# Patient Record
Sex: FEMALE | Race: WHITE | Employment: FULL TIME | URBAN - METROPOLITAN AREA
[De-identification: names, ages, dates, MRNs, and addresses within clinical notes are randomized per-mention and may not be internally consistent; named-entity substitution may affect disease eponyms.]

---

## 2018-12-11 ENCOUNTER — OFFICE VISIT (OUTPATIENT)
Dept: ORTHOPEDIC SURGERY | Age: 43
End: 2018-12-11
Payer: COMMERCIAL

## 2018-12-11 VITALS
BODY MASS INDEX: 22.82 KG/M2 | SYSTOLIC BLOOD PRESSURE: 112 MMHG | HEIGHT: 65 IN | WEIGHT: 137 LBS | DIASTOLIC BLOOD PRESSURE: 71 MMHG | HEART RATE: 72 BPM

## 2018-12-11 DIAGNOSIS — M25.561 RIGHT KNEE PAIN, UNSPECIFIED CHRONICITY: Primary | ICD-10-CM

## 2018-12-11 DIAGNOSIS — M22.41 CHONDROMALACIA OF RIGHT PATELLOFEMORAL JOINT: ICD-10-CM

## 2018-12-11 PROCEDURE — 99204 OFFICE O/P NEW MOD 45 MIN: CPT | Performed by: ORTHOPAEDIC SURGERY

## 2018-12-11 NOTE — PROGRESS NOTES
Review of Systems   Constitutional: Positive for unexpected weight change. Musculoskeletal:        Frozen shoulder   All other systems reviewed and are negative.

## 2018-12-11 NOTE — PROGRESS NOTES
also denies any new injuries. She has not had an MRI since her arthroscopy. Review of Systems:  A 14 point review of systems was completed by the patient on 12/11/2018 and is available in the media section of the scanned medical record and was reviewed on 12/11/2018. The review is negative with the exception of those things mentioned in the HPI and Past Medical History. Past History:  No past medical history on file. Past Surgical History:   Procedure Laterality Date    ANKLE FRACTURE SURGERY Left 06/2013    KNEE ARTHROSCOPY Right 04/2018     No current outpatient prescriptions on file prior to visit. No current facility-administered medications on file prior to visit. Social History     Social History    Marital status:      Spouse name: N/A    Number of children: N/A    Years of education: N/A     Occupational History    Not on file. Social History Main Topics    Smoking status: Former Smoker    Smokeless tobacco: Never Used    Alcohol use Not on file    Drug use: Unknown    Sexual activity: Not on file     Other Topics Concern    Not on file     Social History Narrative    No narrative on file     No family history on file. Current Medications:    Current Outpatient Prescriptions   Medication Sig Dispense Refill    TURMERIC PO Take by mouth       No current facility-administered medications for this visit. Allergies: Allergies   Allergen Reactions    Hydrocodone     Dexamethasone Rash       Physical Exam:  Vitals:    12/11/18 0938   BP: 112/71   Pulse: 72     General: Gavin Cee is a healthy and well appearing 37y.o. year old female who is sitting comfortably in our office in no acute distress. Neuro: alert. oriented  Eyes: Extra-ocular muscles intact  Mouth: Oral mucosa moist. No perioral lesions  Pulm: Respirations unlabored and regular.   Heart: Regular rate and rhythm   Skin: warm, well perfused    Knee any errors to my attention for an addendum. All efforts were made to ensure that this office note is accurate. This dictation was performed with a verbal recognition program (DRAGON) and it was checked for errors. It is possible that there are still dictated errors within this office note. If so, please bring any errors to my attention for an addendum. All efforts were made to ensure that this office note is accurate. Supervising Physician Attestation:  I, Dr. Maninder Zhang, personally performed the services described in this documentation as scribed above, and it is both accurate and complete and I agree with all pertinent clinical information. I personally interviewed the patient and performed a physical examination and medical decision making. I discussed the patient's condition and treatment options and have  reviewed and agree with the past medical, family and social history unless otherwise noted. All of the patient's questions were answered.       Board Certified Orthopaedic Surgeon  [facility] and [address] and [address] and [employer]  Professor of [employer] [address] [employer]

## 2018-12-13 DIAGNOSIS — M25.561 RIGHT KNEE PAIN, UNSPECIFIED CHRONICITY: Primary | ICD-10-CM

## 2018-12-18 ENCOUNTER — OFFICE VISIT (OUTPATIENT)
Dept: ORTHOPEDIC SURGERY | Age: 43
End: 2018-12-18
Payer: COMMERCIAL

## 2018-12-18 VITALS
WEIGHT: 137 LBS | DIASTOLIC BLOOD PRESSURE: 74 MMHG | SYSTOLIC BLOOD PRESSURE: 129 MMHG | BODY MASS INDEX: 22.82 KG/M2 | HEIGHT: 65 IN | HEART RATE: 61 BPM

## 2018-12-18 DIAGNOSIS — M22.41 CHONDROMALACIA OF RIGHT PATELLOFEMORAL JOINT: ICD-10-CM

## 2018-12-18 DIAGNOSIS — M25.561 RIGHT KNEE PAIN, UNSPECIFIED CHRONICITY: Primary | ICD-10-CM

## 2018-12-18 PROCEDURE — 99213 OFFICE O/P EST LOW 20 MIN: CPT | Performed by: ORTHOPAEDIC SURGERY

## 2018-12-18 NOTE — PROGRESS NOTES
Chief Complaint    Follow-up (right knee, MRI results)      History of Present Illness:  Gavin Cee is a 37 y.o. female who presents for follow up of her right knee. Patient was able to get an MRI and presents today review the results. Patient is a runner that has been having patellofemoral syndrome for some time. She reports that she is been unable to run more than 3 miles without having severe symptoms of discomfort. Patient reports that since her last visit she has been able to do elliptical exercises and that has been okay so far. She denies any new injuries. No past medical history on file. Past Surgical History:   Procedure Laterality Date    ANKLE FRACTURE SURGERY Left 06/2013    KNEE ARTHROSCOPY Right 04/2018       No family history on file. Social History     Social History    Marital status:      Spouse name: N/A    Number of children: N/A    Years of education: N/A     Social History Main Topics    Smoking status: Former Smoker    Smokeless tobacco: Never Used    Alcohol use None    Drug use: Unknown    Sexual activity: Not Asked     Other Topics Concern    None     Social History Narrative    None       Current Outpatient Prescriptions   Medication Sig Dispense Refill    TURMERIC PO Take by mouth       No current facility-administered medications for this visit. Allergies   Allergen Reactions    Hydrocodone     Dexamethasone Rash       Review of Systems:  A 14 point review of systems was completed by the patient and is available in the media section of the scanned medical record and was reviewed on 12/18/2018. The review is negative with the exception of those things mentioned in the HPI and Past Medical History     Vital Signs:   /74   Pulse 61   Ht 5' 5\" (1.651 m)   Wt 137 lb (62.1 kg)   BMI 22.80 kg/m²     General Exam:   Mental Status: The patient is oriented to time, place and person.   The patient's mood and affect are related to patellofemoral chondromalacia with mild fat pad inflammation. Encounter Diagnoses   Name Primary?  Right knee pain, unspecified chronicity Yes    Chondromalacia of right patellofemoral joint        Treatment Plan:  We did review the MRI with the patient today. At this time we recommend that she focus on progressing her exercise routine with building up on the elliptical 1st and then the biking. We also recommend that she start physical therapy and have isokinetic strengths testing performed to evaluate her \"quadriceps and hamstring strength. All her questions were fully answered today. We'll see her back in 4 weeks prn or as-needed basis. 5:43 PM      Mauricio Wilson PA-C  Orthopaedic Sports Medicine Physician Assistant    During this examination, Lolita Ingram PA-C, functioned as a scribe for Dr. Divya Ramos. This dictation was performed with a verbal recognition program (DRAGON) and it was checked for errors. It is possible that there are still dictated errors within this office note. If so, please bring any errors to my attention for an addendum. All efforts were made to ensure that this office note is accurate. This dictation was performed with a verbal recognition program (DRAGON) and it was checked for errors. It is possible that there are still dictated errors within this office note. If so, please bring any errors to my attention for an addendum. All efforts were made to ensure that this office note is accurate. Supervising Physician Attestation:  I, Dr. Divya Ramos, personally performed the services described in this documentation as scribed above, and it is both accurate and complete and I agree with all pertinent clinical information. I personally interviewed the patient and performed a physical examination and medical decision making.  I discussed the patient's condition and treatment options and have  reviewed and agree with the past medical, family and social history unless otherwise noted. All of the patient's questions were answered.       Board Certified Orthopaedic Surgeon  [facility] and [address] and [address] and [employer]  Professor of [address]

## 2019-01-02 ENCOUNTER — HOSPITAL ENCOUNTER (OUTPATIENT)
Dept: PHYSICAL THERAPY | Age: 44
Setting detail: THERAPIES SERIES
Discharge: HOME | End: 2019-01-02
Payer: COMMERCIAL

## 2019-01-02 PROCEDURE — 97110 THERAPEUTIC EXERCISES: CPT

## 2019-01-02 PROCEDURE — G8978 MOBILITY CURRENT STATUS: HCPCS

## 2019-01-02 PROCEDURE — 97750 PHYSICAL PERFORMANCE TEST: CPT

## 2019-01-02 PROCEDURE — 97162 PT EVAL MOD COMPLEX 30 MIN: CPT

## 2019-01-02 PROCEDURE — 97530 THERAPEUTIC ACTIVITIES: CPT

## 2019-01-02 PROCEDURE — G8979 MOBILITY GOAL STATUS: HCPCS

## 2019-02-22 ENCOUNTER — HOSPITAL ENCOUNTER (OUTPATIENT)
Dept: PHYSICAL THERAPY | Age: 44
Setting detail: THERAPIES SERIES
End: 2019-02-22
Payer: COMMERCIAL

## 2019-02-27 ENCOUNTER — HOSPITAL ENCOUNTER (OUTPATIENT)
Dept: PHYSICAL THERAPY | Age: 44
Setting detail: THERAPIES SERIES
Discharge: HOME | End: 2019-02-27
Payer: COMMERCIAL

## 2019-02-27 PROCEDURE — 97110 THERAPEUTIC EXERCISES: CPT

## 2019-02-27 PROCEDURE — 97530 THERAPEUTIC ACTIVITIES: CPT

## 2019-02-27 PROCEDURE — 97750 PHYSICAL PERFORMANCE TEST: CPT

## 2019-06-05 ENCOUNTER — HOSPITAL ENCOUNTER (OUTPATIENT)
Dept: PHYSICAL THERAPY | Age: 44
Setting detail: THERAPIES SERIES
Discharge: HOME | End: 2019-06-05
Payer: COMMERCIAL

## 2019-06-05 PROCEDURE — 97110 THERAPEUTIC EXERCISES: CPT

## 2019-06-05 PROCEDURE — 97530 THERAPEUTIC ACTIVITIES: CPT

## 2019-06-05 PROCEDURE — 97750 PHYSICAL PERFORMANCE TEST: CPT

## 2019-06-05 NOTE — PLAN OF CARE
quad and hamstring following PF Protection principles. It was discussed patient can continue to attempt to increase running program, but to monitor symptoms closely and to avoid increased sharp pain and pain at night time. Patient was educated that with MD diagnosis of PF Chondromalacia, high impact activities such as running causes an increased amount of strain and can lead to increased symptoms in knee. It was recommend she continue to cross train with spinning, elliptical, and/or swimming. She verbalized understanding. She will return in 6-8 weeks for repeat isokinetic testing for strength re-evaluation as well as update on symptoms in knee.     Date range of Visits: 19 - 19  Total Visits: 3    Physical Therapy Daily Treatment Note  Date:  2019    Patient Name:  Kota Trevizo    :  1975  MRN: [id number]  Restrictions/Precautions:    Medical/Treatment Diagnosis Information:  · Diagnosis: M25.561 (ICD-10-CM) - Right knee pain, unspecified chronicity  Treatment Diagnosis: Signs and symptoms consistent with MD diagnosis of patellofemoral chondromalacia with increased pain, decreased strength, decreased flexibility and decreased endurance  Insurance/Certification information:  PT Insurance Information: PT BENEFITS 2019 FACILITY/ ANTHEM/ EFFECTIVE 18/ ACTIVE/ DED 4000 MET 0/ PAYS 100%/ OOP 4000 MET 0/ 30 [facility]Y HARD LIMIT/ 0 [facility]/ [facility]#[id number]/ 18 PAG  Physician Information:  Referring Practitioner: Daniel Messina of care signed (Y/N): Signed    Date of Patient follow up with Physician: not yet scheduled     G-Code (if applicable):      Date G-Code Applied:  19     OUTCOME MEASURE DATE DEFICIT   LEFS 19 20% Deficit   LEFS 19 4% Deficit         Progress Note: [x]  Yes  []  No  Next due by: Visit #10       Latex Allergy:  [x]NO      []YES  Preferred Language for Healthcare:   [x]English       []other:    Visit # Insurance Allowable   3 30 VPCY     Pain level: 0/10     SUBJECTIVE:  Patient reports 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 She has been running (x2 per week, outdoor, flat) and spinning (3-4x/week with various resistance, higher resistance aggravates). She has no pain when running flat ground but does need a day of rest in-between to prevent onset of knee pain. She does do spinning on her off days from running without issues. No pain with ADL's. Overall she feels her pain is better in past 6 months but she also admits she has dialed back her activity. She has not run longer than 5 miles and all on flat ground.       OBJECTIVE:       6/5/19  Flexibility L R Comment   Hamstring Mod Mod     Gastroc Mod Mod     ITB Mod WNL     Quad Mild Mild ~1 fist    LENCHO Mod Mod Tightness no pain      1/2/19  ROM PROM AROM Overpressure Comment     L R L R L R     Flexion     135 135         Extension     +2 +2                                                6/5/19  Strength L R Comment   Quad See Biodex See Biodex      Hamstring See Biodex See Biodex      Gastroc         Hip  flexion 5 5      Hip abd 4+ 4+      Hip Ext 5 5                   1/2/19  Special Test Results/Comment   Meniscal Click Neg   Crepitus Pos bilaterally R>L   Hughston's Plica Neg   Kenton's Patella Grind Neg      2/27/19  Girth L R   Mid Patella 33.6 33.5   Suprapatellar 33.7 33.8   5cm above 38.8 37.0   15cm above 46.7 46.1      Orthopedic Special Tests: SL > 10 seconds bilaterally but hip drop noted on RLE; poor hip extension and early heel rise with DL squat        Isokinetic Strength Testing Results Summary  Knee   On 6/5/19 the patient, Denny Archuleta, underwent an Isokinetic Strength Test to evaluate current status and progress of the strengthening phase of his/her current rehabilitation program.  She is currently being treated for Right Knee pain / patellofemoral chondromalacia.     Attached you will find a computer generated summary of the results which outlines the current status.       To summarize these results you will find that Benjamin Ramos underwent a test measuring the strength of the knee Quadriceps and Hamstrings muscle groups at isokinetic speeds of 180 and 300 degrees/second. Standard protocol of testing is to provide pre-test stretching and warm up of both extremities followed by instruction in the test procedure and \"practice\" repetitions prior to each actual test session. The uninjured extremity undergoes the test procedure first followed by the injured extremity.   The two speeds of resistance represent the power and endurance functions of the muscle groups tested.          Bilateral Difference:  Quadricep 180 deg/sec: 2.9% (+1.8%) [] Deficit   [x] Surplus 300 deg/sec: 1.5% (+8.3%) [x] Deficit   [] Surplus   Hamstring 180 deg/sec: 6.3% (+29.2%) [x] Deficit   [] Surplus 300 deg/sec: 16.6% (+8.4%) [x] Deficit   [] Surplus      Normative Data, 180 degrees/second:  Quadricep Normal: 50-65% Patient: 45.0%   Hamstring Normal: 38-49% Patient: 24.7%      Normative Data, 300 degrees/second:  Quadricep Normal: 30-45% Patient: 37.2%   Hamstring Normal: 24-36% Patient: 27.4%         RESTRICTIONS/PRECAUTIONS: PF Protection Program    Exercises/Interventions:  Exercise/Equipment Resistance/Repetitions Other comments   Stretching       Hamstring 3 x 30\" each    Hip Flexor     ITB     Figure 4     Quad 3 x 30\" each    Inclined Calf 3 x 30\" each    Towel Pull             ROM       EOB Self-Assist     Active     Hang Weights     Sheet Pulls     Recumbent Bike     ERMI     Ankle Pumps             Patellar Glides       Medial       Superior       Inferior               Isometrics       Quad sets      Add sets              SLR       Supine     Prone     Abduction     Adducton     SLR+               Glutes       Supine Clams     S/L Clams     Side Stepping       Monster walks               CKC       Weight Shift     Calf raises        Step ups       SL DL               PRE       Cable Column               Balance       Tilt board       SLS      Biodex balance               Other       Bike 5'     Treadmill       Gait Training          Manual Interventions               Therapeutic Exercise and NMR EXR  [x] (47613) Provided verbal/tactile cueing for activities related to strengthening, flexibility, endurance, ROM for improvements in LE, proximal hip, and core control with self care, mobility, lifting, ambulation.  [] (86986) Provided verbal/tactile cueing for activities related to improving balance, coordination, kinesthetic sense, posture, motor skill, proprioception  to assist with LE, proximal hip, and core control in self care, mobility, lifting, ambulation and eccentric single leg control.      NMR and Therapeutic Activities:    [x] (40373 or 77835) Provided verbal/tactile cueing for activities related to improving balance, coordination, kinesthetic sense, posture, motor skill, proprioception and motor activation to allow for proper function of core, proximal hip and LE with self care and ADLs  [] (10259) Gait Re-education- Provided training and instruction to the patient for proper LE, core and proximal hip recruitment and positioning and eccentric body weight control with ambulation re-education including up and down stairs     Home Exercise Program:    [x] (73235) Reviewed/Progressed HEP activities related to strengthening, flexibility, endurance, ROM of core, proximal hip and LE for functional self-care, mobility, lifting and ambulation/stair navigation   [] (10735)Reviewed/Progressed HEP activities related to improving balance, coordination, kinesthetic sense, posture, motor skill, proprioception of core, proximal hip and LE for self care, mobility, lifting, and ambulation/stair navigation      Manual Treatments:  PROM / STM / Oscillations-Mobs:  G-I, II, III, IV (PA's, Inf., Post.)  [] (98637) Provided manual therapy to mobilize LE, proximal hip and/or LS spine soft tissue/joints for the purpose of modulating pain, promoting relaxation,  increasing ROM, reducing/eliminating soft tissue swelling/inflammation/restriction, improving soft tissue extensibility and allowing for proper ROM for normal function with self care, mobility, lifting and ambulation. Modalities:  Denied    Charges:  Timed Code Treatment Minutes: 40'   Total Treatment Minutes: 36'       [] EVAL (LOW) 455 [id number] (typically 20 minutes face-to-face)  [] EVAL (MOD) 80844 (typically 30 minutes face-to-face)  [] EVAL (HIGH) 69938 (typically 45 minutes face-to-face)  [] RE-EVAL   [x] RI(83927) x  1   [] IONTO  [] NMR (28841) x      [] VASO  [] Manual (06866) x       [x] Other: PPT x 1  [x] TA x  1    [] Mech Traction (19509)  [] ES(attended) (64712)      [] ES (un) (79020):       GOALS:  Patient stated goal: Return to marathon training and running    Therapist goals for Patient:   Short Term Goals: To be achieved in: 2 weeks  1. Independent in HEP and progression per patient tolerance, in order to prevent re-injury. MET  2. Patient will have a decrease in pain to facilitate improvement in movement, function, and ADLs as indicated by Functional Deficits. MET    Long Term Goals: To be achieved in: 8 weeks  1. Disability index score of 10% or less for the LEFS to assist with reaching prior level of function. MET  2. Patient will demonstrate increased flexibility to mild or better in BLE to allow for proper joint functioning as indicated by patients Functional Deficits. NOT MET  3. Patient will demonstrate an increase in quad strength to >50% on isokinetic testing PKTQ/BW %  to allow for proper functional mobility as indicated by patients Functional Deficits. NOT MET  4. Patient will return to running > 3 miles without increased symptoms or restriction. MET  5.  Patient will demonstrate strong understanding of patellofemoral protection program in order to continue working independently without risking re-injury (patient specific functional goal)  MET    Progression Towards Functional goals:  [x] Patient is progressing as expected towards functional goals listed. [] Progression is slowed due to complexities listed. [] Progression has been slowed due to co-morbidities. [] Plan just implemented, too soon to assess goals progression  [] Other:     ASSESSMENT:  See above. Treatment/Activity Tolerance:  [x] Patient tolerated treatment well [] Patient limited by fatique  [] Patient limited by pain  [] Patient limited by other medical complications  [] Other:     Patient education:  1/2/19: review of isokinetic test results; extensive review of gym program and PF Protection program  2/27/19: reviewed biodex test results; reviewed HEP and gym program progressions and ideas for quad and hamstring strengthening; provided walk-jog handout  6/5/19: review of biodex test results, on PF joint protection principles and symptoms of chondromalacia, review of gym program + running program    Prognosis: [] Good [x] Fair  [] Poor    Patient Requires Follow-up: [x] Yes  [] No    PLAN: Independent gym / home program, follow up with PT in [place] for follow up as needed or future strength testing  [] Continue per plan of care [] Davon Navarro current plan (see comments)  [x] Plan of care initiated [] Hold pending MD visit [] Discharge    Repeat biodex  LEFS  Strength re-test    Electronically signed by: Brennan Vivas PT, DPT    *Note: If patient does not return for scheduled / recommended follow up visit, this note will serve as their discharge note from physical therapy.

## 2019-06-26 DIAGNOSIS — M22.41 CHONDROMALACIA OF RIGHT PATELLOFEMORAL JOINT: Primary | ICD-10-CM

## 2019-07-24 ENCOUNTER — APPOINTMENT (OUTPATIENT)
Dept: PHYSICAL THERAPY | Age: 44
End: 2019-07-24
Payer: COMMERCIAL

## 2019-07-25 ENCOUNTER — HOSPITAL ENCOUNTER (OUTPATIENT)
Dept: PHYSICAL THERAPY | Age: 44
Setting detail: THERAPIES SERIES
Discharge: HOME | End: 2019-07-25
Payer: COMMERCIAL

## 2019-07-25 ENCOUNTER — OFFICE VISIT (OUTPATIENT)
Dept: ORTHOPEDIC SURGERY | Age: 44
End: 2019-07-25
Payer: COMMERCIAL

## 2019-07-25 VITALS
HEART RATE: 64 BPM | DIASTOLIC BLOOD PRESSURE: 68 MMHG | SYSTOLIC BLOOD PRESSURE: 121 MMHG | HEIGHT: 65 IN | WEIGHT: 137 LBS | BODY MASS INDEX: 22.82 KG/M2

## 2019-07-25 DIAGNOSIS — M25.561 RIGHT KNEE PAIN, UNSPECIFIED CHRONICITY: ICD-10-CM

## 2019-07-25 DIAGNOSIS — M22.41 CHONDROMALACIA OF RIGHT PATELLOFEMORAL JOINT: Primary | ICD-10-CM

## 2019-07-25 PROCEDURE — 97530 THERAPEUTIC ACTIVITIES: CPT

## 2019-07-25 PROCEDURE — 97110 THERAPEUTIC EXERCISES: CPT

## 2019-07-25 PROCEDURE — 97750 PHYSICAL PERFORMANCE TEST: CPT

## 2019-07-25 PROCEDURE — 99213 OFFICE O/P EST LOW 20 MIN: CPT | Performed by: ORTHOPAEDIC SURGERY

## 2021-01-14 ENCOUNTER — OFFICE VISIT (OUTPATIENT)
Dept: ORTHOPEDIC SURGERY | Age: 46
End: 2021-01-14
Payer: COMMERCIAL

## 2021-01-14 VITALS — BODY MASS INDEX: 23.14 KG/M2 | WEIGHT: 144 LBS | TEMPERATURE: 97.3 F | HEIGHT: 66 IN

## 2021-01-14 DIAGNOSIS — M25.562 PAIN IN BOTH KNEES, UNSPECIFIED CHRONICITY: ICD-10-CM

## 2021-01-14 DIAGNOSIS — M17.11 PATELLOFEMORAL ARTHRITIS OF RIGHT KNEE: Primary | ICD-10-CM

## 2021-01-14 DIAGNOSIS — M25.561 PAIN IN BOTH KNEES, UNSPECIFIED CHRONICITY: ICD-10-CM

## 2021-01-14 PROCEDURE — 99213 OFFICE O/P EST LOW 20 MIN: CPT | Performed by: ORTHOPAEDIC SURGERY

## 2021-01-18 NOTE — PROGRESS NOTES
[facility]  History and Physical  Knee Pain    Date:  2021    Name:  Maurilio Storm  Address:  [address] [place] [address]    :  1975      Age:   39 y.o.    SSN:  [id number]      Medical Record Number:  <[id number]>      Chief Complaint    Follow-up (bilateral knees, right>left )      History of Present Illness:  Maurilio Storm is a 39 y.o. female who presents for an evaluation of her right knee pain. 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 Patient was last seen in 2019 where she was once again diagnosed with patellofemoral chondromalacia of the right knee as the etiology to her symptoms. Patient observed conservative therapy and activity modification noting a reduction in her pain and symptoms. Patient has returned to running and cycling multiple times a week over the past year. She states that she runs 4 miles approximately 3 days a week and cycles approximately 8 miles a day 2-3 times a week. Patient states her pain has begun to become more frequent. She noted pain mostly during her runs after the first half mile to mile. Now she notes pain during the day with her daily activities most notably with prolonged walking or stairs. She rates her pain 8/10 mostly dull and achy with occasional sharp bouts in the patellofemoral joint. Patient denies any mechanical symptoms. The patient denies any new injury. The patient denies any clicking, popping, catching, giving way, joint locking, numbness, paresthesias, or weakness.       Pain Assessment  Location of Pain: Knee  Location Modifiers: Left, Right  Severity of Pain: 2  Quality of Pain: Sharp  Duration of Pain: Persistent  Frequency of Pain: Constant  Aggravating Factors: Walking, Squatting, Stairs, Other (Comment)(running)  Relieving Factors: Rest  Result of Injury: No  Work-Related Injury: No  Are there other pain locations you wish to document?: No    No past medical history on file. Past Surgical History:   Procedure Laterality Date    ANKLE FRACTURE SURGERY Left 06/2013    KNEE ARTHROSCOPY Right 04/2018       No family history on file. Social History     Socioeconomic History    Marital status:      Spouse name: None    Number of children: None    Years of education: None    Highest education level: None   Occupational History    None   Social Needs    Financial resource strain: None    Food insecurity     Worry: None     Inability: None    Transportation needs     Medical: None     Non-medical: None   Tobacco Use    Smoking status: Former Smoker    Smokeless tobacco: Never Used   Substance and Sexual Activity    Alcohol use: None    Drug use: None    Sexual activity: None   Lifestyle    Physical activity     Days per week: None     Minutes per session: None    Stress: None   Relationships    Social connections     Talks on phone: None     Gets together: None     Attends Christian service: None     Active member of club or organization: None     Attends meetings of clubs or organizations: None     Relationship status: None    Intimate partner violence     Fear of current or ex partner: None     Emotionally abused: None     Physically abused: None     Forced sexual activity: None   Other Topics Concern    None   Social History Narrative    None       Current Outpatient Medications   Medication Sig Dispense Refill    TURMERIC PO Take by mouth       No current facility-administered medications for this visit. Allergies   Allergen Reactions    Hydrocodone     Dexamethasone Rash         Review of Systems:  A 14 point review of systems was completed by the patient and is available in the media section of the scanned medical record and was reviewed on 1/18/2021.   The review is negative with the exception of those things mentioned in the HPI and Past Medical History   Review of Systems   Musculoskeletal: Positive for arthralgias. Negative for myalgias. Neurological: Negative for weakness and numbness. Vital Signs:   Temp 97.3 °F (36.3 °C) (Infrared)   Ht 5' 6\" (1.676 m)   Wt 144 lb (65.3 kg)   BMI 23.24 kg/m²     General Exam:   General: Maurilio Storm is a healthy and well appearing 39y.o. year old female who is sitting comfortably in our office in no acute distress. Neuro: Alert & Oriented x 3,  normal,  no focal deficits noted. Normal mood & affect. Eyes: Sclera clear  Ears: Normal external ear  Mouth: Patient wearing a mask  Pulm: Respirations unlabored and regular   Skin: Warm, well perfused      Knee Examination: Right    Inspection: No effusion, ecchymosis, discoloration, erythema, excessive warmth. no gross deformities, no signs of infection. Palpation: There is patellofemoral crepitus, there is no joint line tenderness. No other osseous or soft tissue tenderness around the knee. Negative calf tenderness    Range of Motion: 0-145 degrees without pain or difficulty. Pain with patellar compression. Strength:  5/5 quadriceps, 5/5 hamstrings    Special Tests: No ligament instability, valgus and varus stress test are stable at 0 and 30°. Lachman's exhibits a solid endpoint. Negative posterior drawer. Negative Homans sign    Gait: Steady, Non antalgic, without the use of assistive devices    Alignment:  Varus deformity    Neuro: Sensation equal bilateral lower extremities    Vascular: 2+ posterior tibialis pulse      Contralateral Knee Comparison Examination: Left    Inspection:  No effusion, ecchymosis, discoloration, erythema, excessive warmth. no gross deformities, no signs of infection. Palpation: There is patellofemoral crepitus, there is no joint line tenderness. No other osseous or soft tissue tenderness around the knee.   Negative calf tenderness    Range of Motion: 0-145 degrees without pain or difficulty. no pain with patellar compression    Strength:  5/5 quadriceps, 5/5 hamstrings    Special Tests: No ligament instability, valgus and varus stress test are stable at 0 and 30°. Lachman's exhibits a solid endpoint. Negative posterior drawer. Negative Homans sign    Gait: Steady, Non antalgic, without the use of assistive devices    Alignment: Varus deformity    Neuro: Sensation equal bilateral lower extremities    Vascular: 2+ posterior tibialis pulse      Radiology:     X-rays obtained and reviewed in office: AP and merchant view of both knees and a lateral of the right. Impression: No fractures, dislocations, visible tumors, or signs of acute trauma. Patient exhibits decreased joint space in the medial and lateral femoral-tibial compartments bilaterally, predominantly in the medial joint lines. Patient has decreased joint spacing in the patellofemoral joints bilaterally. Patella is riding appropriately in the trochlear groove with no subluxation or tilt noted. No osteophytes or bone growths noted. No loose bodies or foreign bodies. Office Procedures:  Orders Placed This Encounter   Procedures    XR KNEE LEFT (3 VIEWS)     Standing Status:   Future     Number of Occurrences:   1     Standing Expiration Date:   1/14/2022    XR KNEE RIGHT (3 VIEWS)     Standing Status:   Future     Number of Occurrences:   1     Standing Expiration Date:   1/14/2022            Assessment: Patient is a 39 y.o. female presenting to the clinic for an evaluation of her acute on chronic right knee pain. Patient has a diagnosis of patellofemoral arthritis being treated with conservative therapy.     Visit Diagnoses       Codes    Patellofemoral arthritis of right knee    -  Primary M17.11    Pain in both knees, unspecified chronicity     M25.561, M25.562            Medical decision making:  Patient's workup and evaluation were reviewed with the Attestation:  I, Dr. Parag Ramirez, personally performed the services described in this documentation as scribed above, and it is both accurate and complete and I agree with all pertinent clinical information. I personally interviewed the patient and performed a physical examination and medical decision making. I discussed the patient's condition and treatment options and have  reviewed and agree with the past medical, family and social history unless otherwise noted. All of the patient's questions were answered.       Board Certified Orthopaedic Surgeon  [facility] and [address] [place] [address] and [address] [employer]  Professor of [address]

## 2023-09-22 ENCOUNTER — OFFICE (OUTPATIENT)
Dept: URBAN - METROPOLITAN AREA PATHOLOGY 1 | Facility: PATHOLOGY | Age: 48
End: 2023-09-22
Payer: COMMERCIAL

## 2023-09-22 ENCOUNTER — AMBULATORY SURGICAL CENTER (OUTPATIENT)
Dept: URBAN - METROPOLITAN AREA SURGERY 7 | Facility: SURGERY | Age: 48
End: 2023-09-22
Payer: COMMERCIAL

## 2023-09-22 VITALS
DIASTOLIC BLOOD PRESSURE: 60 MMHG | OXYGEN SATURATION: 96 % | DIASTOLIC BLOOD PRESSURE: 64 MMHG | HEART RATE: 70 BPM | OXYGEN SATURATION: 93 % | HEART RATE: 77 BPM | RESPIRATION RATE: 23 BRPM | SYSTOLIC BLOOD PRESSURE: 140 MMHG | DIASTOLIC BLOOD PRESSURE: 60 MMHG | OXYGEN SATURATION: 98 % | DIASTOLIC BLOOD PRESSURE: 64 MMHG | HEART RATE: 69 BPM | SYSTOLIC BLOOD PRESSURE: 152 MMHG | OXYGEN SATURATION: 93 % | HEART RATE: 66 BPM | HEART RATE: 67 BPM | DIASTOLIC BLOOD PRESSURE: 77 MMHG | SYSTOLIC BLOOD PRESSURE: 131 MMHG | HEART RATE: 66 BPM | WEIGHT: 150 LBS | SYSTOLIC BLOOD PRESSURE: 100 MMHG | RESPIRATION RATE: 21 BRPM | HEART RATE: 64 BPM | SYSTOLIC BLOOD PRESSURE: 96 MMHG | RESPIRATION RATE: 23 BRPM | DIASTOLIC BLOOD PRESSURE: 74 MMHG | HEART RATE: 68 BPM | OXYGEN SATURATION: 96 % | RESPIRATION RATE: 20 BRPM | DIASTOLIC BLOOD PRESSURE: 66 MMHG | RESPIRATION RATE: 31 BRPM | HEART RATE: 67 BPM | RESPIRATION RATE: 12 BRPM | RESPIRATION RATE: 12 BRPM | RESPIRATION RATE: 22 BRPM | RESPIRATION RATE: 21 BRPM | DIASTOLIC BLOOD PRESSURE: 63 MMHG | DIASTOLIC BLOOD PRESSURE: 63 MMHG | DIASTOLIC BLOOD PRESSURE: 76 MMHG | DIASTOLIC BLOOD PRESSURE: 77 MMHG | DIASTOLIC BLOOD PRESSURE: 59 MMHG | HEART RATE: 77 BPM | OXYGEN SATURATION: 98 % | HEART RATE: 82 BPM | RESPIRATION RATE: 17 BRPM | DIASTOLIC BLOOD PRESSURE: 74 MMHG | TEMPERATURE: 97.1 F | SYSTOLIC BLOOD PRESSURE: 100 MMHG | DIASTOLIC BLOOD PRESSURE: 65 MMHG | HEART RATE: 69 BPM | OXYGEN SATURATION: 100 % | RESPIRATION RATE: 17 BRPM | SYSTOLIC BLOOD PRESSURE: 140 MMHG | HEART RATE: 70 BPM | WEIGHT: 150 LBS | HEART RATE: 68 BPM | SYSTOLIC BLOOD PRESSURE: 102 MMHG | DIASTOLIC BLOOD PRESSURE: 113 MMHG | HEART RATE: 82 BPM | HEIGHT: 66 IN | RESPIRATION RATE: 16 BRPM | OXYGEN SATURATION: 99 % | DIASTOLIC BLOOD PRESSURE: 76 MMHG | SYSTOLIC BLOOD PRESSURE: 152 MMHG | DIASTOLIC BLOOD PRESSURE: 113 MMHG | OXYGEN SATURATION: 97 % | OXYGEN SATURATION: 95 % | OXYGEN SATURATION: 100 % | RESPIRATION RATE: 22 BRPM | RESPIRATION RATE: 31 BRPM | DIASTOLIC BLOOD PRESSURE: 65 MMHG | SYSTOLIC BLOOD PRESSURE: 131 MMHG | TEMPERATURE: 97.1 F | HEIGHT: 66 IN | RESPIRATION RATE: 20 BRPM | DIASTOLIC BLOOD PRESSURE: 59 MMHG | DIASTOLIC BLOOD PRESSURE: 66 MMHG | OXYGEN SATURATION: 97 % | OXYGEN SATURATION: 99 % | SYSTOLIC BLOOD PRESSURE: 96 MMHG | RESPIRATION RATE: 16 BRPM | OXYGEN SATURATION: 95 % | HEART RATE: 64 BPM | SYSTOLIC BLOOD PRESSURE: 102 MMHG

## 2023-09-22 DIAGNOSIS — Z12.11 ENCOUNTER FOR SCREENING FOR MALIGNANT NEOPLASM OF COLON: ICD-10-CM

## 2023-09-22 DIAGNOSIS — D12.5 BENIGN NEOPLASM OF SIGMOID COLON: ICD-10-CM

## 2023-09-22 DIAGNOSIS — K63.5 POLYP OF COLON: ICD-10-CM

## 2023-09-22 DIAGNOSIS — K57.30 DIVERTICULOSIS OF LARGE INTESTINE WITHOUT PERFORATION OR ABS: ICD-10-CM

## 2023-09-22 PROCEDURE — 45385 COLONOSCOPY W/LESION REMOVAL: CPT | Mod: 33 | Performed by: INTERNAL MEDICINE

## 2023-09-22 PROCEDURE — 88305 TISSUE EXAM BY PATHOLOGIST: CPT | Performed by: PATHOLOGY

## 2023-09-27 LAB
PDF: PDF REPORT: (no result)
PDF: PDF REPORT: (no result)